# Patient Record
Sex: MALE | Race: WHITE
[De-identification: names, ages, dates, MRNs, and addresses within clinical notes are randomized per-mention and may not be internally consistent; named-entity substitution may affect disease eponyms.]

---

## 2020-01-21 ENCOUNTER — HOSPITAL ENCOUNTER (EMERGENCY)
Dept: HOSPITAL 33 - ED | Age: 25
Discharge: HOME | End: 2020-01-21
Payer: COMMERCIAL

## 2020-01-21 VITALS — HEART RATE: 97 BPM | DIASTOLIC BLOOD PRESSURE: 94 MMHG | SYSTOLIC BLOOD PRESSURE: 131 MMHG

## 2020-01-21 VITALS — OXYGEN SATURATION: 98 %

## 2020-01-21 DIAGNOSIS — R56.9: Primary | ICD-10-CM

## 2020-01-21 LAB
ALBUMIN SERPL-MCNC: 4.7 G/DL (ref 3.5–5)
ALP SERPL-CCNC: 49 U/L (ref 38–126)
ALT SERPL-CCNC: 22 U/L (ref 0–50)
AMPHETAMINES UR QL: NEGATIVE
ANION GAP SERPL CALC-SCNC: 16.6 MEQ/L (ref 5–15)
AST SERPL QL: 26 U/L (ref 17–59)
BARBITURATES UR QL: NEGATIVE
BASOPHILS # BLD AUTO: 0.03 10*3/UL (ref 0–0.4)
BASOPHILS NFR BLD AUTO: 0.4 % (ref 0–0.4)
BENZODIAZ UR QL SCN: NEGATIVE
BILIRUB BLD-MCNC: 0.7 MG/DL (ref 0.2–1.3)
BUN SERPL-MCNC: 12 MG/DL (ref 9–20)
CALCIUM SPEC-MCNC: 9.4 MG/DL (ref 8.4–10.2)
CHLORIDE SERPL-SCNC: 103 MMOL/L (ref 98–107)
CO2 SERPL-SCNC: 26 MMOL/L (ref 22–30)
COCAINE UR QL SCN: NEGATIVE
CREAT SERPL-MCNC: 0.96 MG/DL (ref 0.66–1.25)
EOSINOPHIL # BLD AUTO: 0.04 10*3/UL (ref 0–0.5)
GLUCOSE SERPL-MCNC: 98 MG/DL (ref 74–106)
GLUCOSE UR-MCNC: NEGATIVE MG/DL
HCT VFR BLD AUTO: 43.8 % (ref 42–50)
HGB BLD-MCNC: 15.4 GM/DL (ref 12.5–18)
LYMPHOCYTES # SPEC AUTO: 1.88 10*3/UL (ref 1–4.6)
MCH RBC QN AUTO: 31.8 PG (ref 26–32)
MCHC RBC AUTO-ENTMCNC: 35.2 G/DL (ref 32–36)
METHADONE UR QL: NEGATIVE
MONOCYTES # BLD AUTO: 0.54 10*3/UL (ref 0–1.3)
OPIATES UR QL: NEGATIVE
PCP UR QL CFM>20 NG/ML: NEGATIVE
PLATELET # BLD AUTO: 225 K/MM3 (ref 150–450)
POTASSIUM SERPLBLD-SCNC: 4.4 MMOL/L (ref 3.5–5.1)
PROT SERPL-MCNC: 8.2 G/DL (ref 6.3–8.2)
PROT UR STRIP-MCNC: NEGATIVE MG/DL
RBC # BLD AUTO: 4.84 M/MM3 (ref 4.1–5.6)
RBC #/AREA URNS HPF: (no result) /HPF (ref 0–2)
SODIUM SERPL-SCNC: 141 MMOL/L (ref 137–145)
THC UR QL SCN: NEGATIVE
WBC # BLD AUTO: 8.5 K/MM3 (ref 4–10.5)
WBC #/AREA URNS HPF: (no result) /HPF (ref 0–5)

## 2020-01-21 PROCEDURE — 80053 COMPREHEN METABOLIC PANEL: CPT

## 2020-01-21 PROCEDURE — 80307 DRUG TEST PRSMV CHEM ANLYZR: CPT

## 2020-01-21 PROCEDURE — 81001 URINALYSIS AUTO W/SCOPE: CPT

## 2020-01-21 PROCEDURE — 94760 N-INVAS EAR/PLS OXIMETRY 1: CPT

## 2020-01-21 PROCEDURE — 96365 THER/PROPH/DIAG IV INF INIT: CPT

## 2020-01-21 PROCEDURE — 36415 COLL VENOUS BLD VENIPUNCTURE: CPT

## 2020-01-21 PROCEDURE — 93041 RHYTHM ECG TRACING: CPT

## 2020-01-21 PROCEDURE — 85025 COMPLETE CBC W/AUTO DIFF WBC: CPT

## 2020-01-21 PROCEDURE — 70450 CT HEAD/BRAIN W/O DYE: CPT

## 2020-01-21 PROCEDURE — 99284 EMERGENCY DEPT VISIT MOD MDM: CPT

## 2020-01-21 NOTE — XRAY
Indication: Headache.  Seizures.



Multiple contiguous axial images obtained through the head without contrast.



Comparison: None



Normal appearing brain parenchyma, ventricles, and bony calvarium.  Visualized

paranasal sinuses and mastoid air cells are clear.



Impression: Normal CT head without contrast exam.

## 2020-01-21 NOTE — ERPHSYRPT
- History of Present Illness


Time Seen by Provider: 01/21/20 11:20


Source: patient, police


Exam Limitations: no limitations


Patient Subjective Stated Complaint: pt here for a seizure at the residential, he 

states last seizure was a month ago and he has never seen a doc for hes seizures

, pt co pain to head, which he stated happend before the seizure, he gets 

fregquent headaches,


Triage Nursing Assessment: pt alert and oreinted , resp easy, skin w/d/p.moves 

all ext well, no edema, he states he is fine and wants to go back to residential


Physician History: 





25 y/o white male resident in local residential with h/o untreated seizures, presents 

with second seizure in a month. pt denies illicit drug intake, denies acute 

head trauma. pt denies loss of bowel or bladder control


Timing/Duration: today


Severity: mild


Character of Deficits: none


Deficits: no difficulties


Baseline/Normal Cognition: alert oriented x 3


Current Cognition: alert oriented x 3


Baseline Gait: walks w/o assistance


Associated Symptoms: seizures


Allergies/Adverse Reactions: 








No Known Drug Allergies Allergy (Unverified 01/21/20 11:11)


 





Hx Influenza Vaccination/Date Given: No


Hx Pneumococcal Vaccination/Date Given: No


Immunizations Up to Date: Yes





- Review of Systems


Constitutional: No Symptoms


Eyes: No Symptoms


Ears, Nose, & Throat: No Symptoms


Respiratory: No Symptoms


Cardiac: No Symptoms


Abdominal/Gastrointestinal: No Symptoms


Genitourinary Symptoms: No Symptoms


Musculoskeletal: No Symptoms


Skin: No Symptoms


Neurological: Headache, Seizure


Psychological: No Symptoms


Endocrine: No Symptoms


Hematologic/Lymphatic: No Symptoms


Immunological/Allergic: No Symptoms


All Other Systems: Reviewed and Negative





- Past Medical History


Pertinent Past Medical History: Yes


Neurological History: Seizures


ENT History: No Pertinent History


Cardiac History: No Pertinent History


Respiratory History: No Pertinent History


Endocrine Medical History: No Pertinent History


Musculoskeletal History: No Pertinent History


GI Medical History: No Pertinent History


 History: No Pertinent History


Psycho-Social History: No Pertinent History


Male Reproductive Disorders: No Pertinent History





- Past Surgical History


Past Surgical History: No


Neuro Surgical History: No Pertinent History


Cardiac: No Pertinent History


Respiratory: No Pertinent History


Gastrointestinal: No Pertinent History


Genitourinary: No Pertinent History


Musculoskeletal: No Pertinent History


Male Surgical History: No Pertinent History





- Social History


Smoking Status: Current every day smoker


Exposure to second hand smoke: Yes


Drug Use: none


Patient Lives Alone: No





- Nursing Vital Signs


Nursing Vital Signs: 


 Initial Vital Signs











Temperature  98.2 F   01/21/20 11:04


 


Pulse Rate  101 H  01/21/20 11:04


 


Respiratory Rate  16   01/21/20 11:04


 


Blood Pressure  130/94   01/21/20 11:04


 


O2 Sat by Pulse Oximetry  98   01/21/20 11:04








 Pain Scale











Pain Intensity                 7

















- [place] Coma Scale


Best Eye Response ([place]): (4) open spontaneously


Best Verbal Response ([place]): (5) oriented


Best Motor Response (Michel): (6) obeys commands


[place] Total: 15





- Physical Exam


General Appearance: no apparent distress


Eye Exam: bilateral eye: normal inspection, PERRL, EOMI


Ears, Nose, Throat Exam: normal ENT inspection, TMs normal, moist mucous 

membranes


Neck Exam: normal inspection, non-tender, supple, full range of motion


Respiratory: normal breath sounds, lungs clear, airway intact, No chest 

tenderness, No respiratory distress


Cardiovascular: regular rate/rhythm, normal heart sounds, normal peripheral 

pulses


Gastrointestinal: soft, normal bowel sounds, No tenderness


Rectal Exam: not done


Back Exam: normal inspection, normal range of motion, No CVA tenderness, No 

vertebral tenderness


Extremity Exam: normal inspection, normal range of motion, pelvis stable


Mental Status: alert, oriented x 3, cooperative


CNs Exam: normal hearing, normal speech, PERRL


Coordination/Gait: normal finger to nose, normal gait, normal cerebellar 

function


Motor/Sensory: no motor deficit, no sensory deficit, no pronator drift


Skin Exam: normal color, warm, dry


**SpO2 Interpretation**: normal


SpO2: 98


O2 Delivery: Room Air





- Course


Nursing assessment & vital signs reviewed: Yes


EKG Interpreted by Me: RATE ([id number]), Sinus Rhythm, NORMAL AXIS, NORMAL INTERVALS, 

NORMAL QRS


Ordered Tests: 


 Active Orders 24 hr











 Category Date Time Status


 


 Cardiac Monitor STAT Care  01/21/20 11:30 Active


 


 Clean Catch Urine Specimen STAT Care  01/21/20 11:28 Active


 


 IV Insertion STAT Care  01/21/20 11:28 Active


 


 Pulse Oximetry (ED) STAT Care  01/21/20 11:28 Active


 


 HEAD WITHOUT CONTRAST [CT] Stat Exams  01/21/20 11:29 Completed


 


 CBC W DIFF Stat Lab  01/21/20 12:00 Completed


 


 CMP Stat Lab  01/21/20 12:00 Completed


 


 UA W/RFX UR CULTURE Stat Lab  01/21/20 11:59 Completed


 


 Urine Triage Profile Stat Lab  01/21/20 11:59 Completed








Medication Summary














Discontinued Medications














Generic Name Dose Route Start Last Admin





  Trade Name Yuly  PRN Reason Stop Dose Admin


 


Acetaminophen  650 mg  01/21/20 11:30  01/21/20 11:35





  Tylenol 325 Mg***  PO  01/21/20 11:31  650 mg





  STAT STA   Administration





     





     





     





     


 


Acetaminophen  Confirm  01/21/20 11:34  





  Tylenol 325 Mg***  Administered  01/21/20 11:35  





  Dose   





  650 mg   





  .ROUTE   





  .STK-MED ONE   





     





     





     





     











Lab/Rad Data: 


 Laboratory Result Diagrams





 01/21/20 12:00 





 01/21/20 12:00 





 Laboratory Results











  01/21/20 01/21/20 01/21/20 Range/Units





  12:00 12:00 11:59 


 


WBC   8.5   (4.0-10.5)  K/mm3


 


RBC   4.84   (4.1-5.6)  M/mm3


 


Hgb   15.4   (12.5-18.0)  gm/dl


 


Hct   43.8   (42-50)  %


 


MCV   90.5   ()  fl


 


MCH   31.8   (26-32)  pg


 


MCHC   35.2   (32-36)  g/dl


 


RDW   12.2   (11.5-14.0)  %


 


Plt Count   225   (150-450)  K/mm3


 


MPV   9.9   (7.5-11.0)  fl


 


Gran %   70.7 H   (36.0-66.0)  %


 


Eos # (Auto)   0.04   (0-0.5)  


 


Absolute Lymphs (auto)   1.88   (1.0-4.6)  


 


Absolute Monos (auto)   0.54   (0.0-1.3)  


 


Lymphocytes %   22.1 L   (24.0-44.0)  %


 


Monocytes %   6.3   (0.0-12.0)  %


 


Eosinophils %   0.5   (0.00-5.0)  %


 


Basophils %   0.4   (0.0-0.4)  %


 


Absolute Granulocytes   6.02   (1.4-6.9)  


 


Basophils #   0.03   (0-0.4)  


 


Sodium  141    (137-145)  mmol/L


 


Potassium  4.4    (3.5-5.1)  mmol/L


 


Chloride  103    ()  mmol/L


 


Carbon Dioxide  26    (22-30)  mmol/L


 


Anion Gap  16.6 H    (5-15)  MEQ/L


 


BUN  12    (9-20)  mg/dL


 


Creatinine  0.96    (0.66-1.25)  mg/dL


 


Estimated GFR  > 60.0    ML/MIN


 


Glucose  98    ()  mg/dL


 


Calcium  9.4    (8.4-10.2)  mg/dL


 


Total Bilirubin  0.70    (0.2-1.3)  mg/dL


 


AST  26    (17-59)  U/L


 


ALT  22    (0-50)  U/L


 


Alkaline Phosphatase  49    ()  U/L


 


Serum Total Protein  8.2    (6.3-8.2)  g/dL


 


Albumin  4.7    (3.5-5.0)  g/dL


 


Urine Color     (YELLOW)  


 


Urine Appearance     (CLEAR)  


 


Urine pH     (5-6)  


 


Ur Specific Gravity     (1.005-1.025)  


 


Urine Protein     (Negative)  


 


Urine Ketones     (NEGATIVE)  


 


Urine Blood     (0-5)  Chris/ul


 


Urine Nitrite     (NEGATIVE)  


 


Urine Bilirubin     (NEGATIVE)  


 


Urine Urobilinogen     (0-1)  mg/dL


 


Ur Leukocyte Esterase     (NEGATIVE)  


 


Urine WBC (Auto)     (0-5)  /HPF


 


Urine RBC (Auto)     (0-2)  /HPF


 


U Epithel Cells (Auto)     (FEW)  /HPF


 


Urine Bacteria (Auto)     (NEGATIVE)  /HPF


 


Urine Mucus (Auto)     (NEGATIVE)  /HPF


 


Urine Culture Reflexed     (NO)  


 


Urine Glucose     (NEGATIVE)  mg/dL


 


Urine Opiates Level    NEGATIVE  (NEGATIVE)  


 


Ur Methadone    NEGATIVE  (NEGATIVE)  


 


Urine Barbiturates    NEGATIVE  (NEGATIVE)  


 


Ur Phencyclidine (PCP)    NEGATIVE  (NEGATIVE)  


 


Urine Amphetamine    NEGATIVE  (NEGATIVE)  


 


U Benzodiazepine Level    NEGATIVE  (NEGATIVE)  


 


Urine Cocaine    NEGATIVE  (NEGATIVE)  


 


Urine Marijuana (THC)    NEGATIVE  (NEGATIVE)  














  01/21/20 Range/Units





  11:59 


 


WBC   (4.0-10.5)  K/mm3


 


RBC   (4.1-5.6)  M/mm3


 


Hgb   (12.5-18.0)  gm/dl


 


Hct   (42-50)  %


 


MCV   ()  fl


 


MCH   (26-32)  pg


 


MCHC   (32-36)  g/dl


 


RDW   (11.5-14.0)  %


 


Plt Count   (150-450)  K/mm3


 


MPV   (7.5-11.0)  fl


 


Gran %   (36.0-66.0)  %


 


Eos # (Auto)   (0-0.5)  


 


Absolute Lymphs (auto)   (1.0-4.6)  


 


Absolute Monos (auto)   (0.0-1.3)  


 


Lymphocytes %   (24.0-44.0)  %


 


Monocytes %   (0.0-12.0)  %


 


Eosinophils %   (0.00-5.0)  %


 


Basophils %   (0.0-0.4)  %


 


Absolute Granulocytes   (1.4-6.9)  


 


Basophils #   (0-0.4)  


 


Sodium   (137-145)  mmol/L


 


Potassium   (3.5-5.1)  mmol/L


 


Chloride   ()  mmol/L


 


Carbon Dioxide   (22-30)  mmol/L


 


Anion Gap   (5-15)  MEQ/L


 


BUN   (9-20)  mg/dL


 


Creatinine   (0.66-1.25)  mg/dL


 


Estimated GFR   ML/MIN


 


Glucose   ()  mg/dL


 


Calcium   (8.4-10.2)  mg/dL


 


Total Bilirubin   (0.2-1.3)  mg/dL


 


AST   (17-59)  U/L


 


ALT   (0-50)  U/L


 


Alkaline Phosphatase   ()  U/L


 


Serum Total Protein   (6.3-8.2)  g/dL


 


Albumin   (3.5-5.0)  g/dL


 


Urine Color  YELLOW  (YELLOW)  


 


Urine Appearance  CLEAR  (CLEAR)  


 


Urine pH  7.0  (5-6)  


 


Ur Specific Gravity  1.016  (1.005-1.025)  


 


Urine Protein  NEGATIVE  (Negative)  


 


Urine Ketones  NEGATIVE  (NEGATIVE)  


 


Urine Blood  NEGATIVE  (0-5)  Chris/ul


 


Urine Nitrite  NEGATIVE  (NEGATIVE)  


 


Urine Bilirubin  NEGATIVE  (NEGATIVE)  


 


Urine Urobilinogen  NEGATIVE  (0-1)  mg/dL


 


Ur Leukocyte Esterase  NEGATIVE  (NEGATIVE)  


 


Urine WBC (Auto)  0-2  (0-5)  /HPF


 


Urine RBC (Auto)  NONE  (0-2)  /HPF


 


U Epithel Cells (Auto)  NONE  (FEW)  /HPF


 


Urine Bacteria (Auto)  NONE  (NEGATIVE)  /HPF


 


Urine Mucus (Auto)  SLIGHT  (NEGATIVE)  /HPF


 


Urine Culture Reflexed  NO  (NO)  


 


Urine Glucose  NEGATIVE  (NEGATIVE)  mg/dL


 


Urine Opiates Level   (NEGATIVE)  


 


Ur Methadone   (NEGATIVE)  


 


Urine Barbiturates   (NEGATIVE)  


 


Ur Phencyclidine (PCP)   (NEGATIVE)  


 


Urine Amphetamine   (NEGATIVE)  


 


U Benzodiazepine Level   (NEGATIVE)  


 


Urine Cocaine   (NEGATIVE)  


 


Urine Marijuana (THC)   (NEGATIVE)  














- Progress


Progress: improved


Progress Note: 





01/21/20 13:12


ct head-wnl no acute intracranial process


Counseled pt/family regarding: lab results, diagnosis, need for follow-up, rad 

results





- Departure


Departure Disposition: alf/jail


Clinical Impression: 


 Seizure





Condition: Stable


Critical Care Time: No


Additional Instructions: 


follow up with primary doctor and neurologist for further management


Prescriptions: 


Phenytoin Sod Extended 100 mg* [Dilantin 100 MG***] 100 mg PO TID #30 capsule

## 2021-09-21 ENCOUNTER — HOSPITAL ENCOUNTER (EMERGENCY)
Dept: HOSPITAL 33 - ED | Age: 26
Discharge: HOME | End: 2021-09-21
Payer: COMMERCIAL

## 2021-09-21 VITALS — SYSTOLIC BLOOD PRESSURE: 160 MMHG | HEART RATE: 60 BPM | OXYGEN SATURATION: 99 % | DIASTOLIC BLOOD PRESSURE: 86 MMHG

## 2021-09-21 DIAGNOSIS — Z79.899: ICD-10-CM

## 2021-09-21 DIAGNOSIS — G40.909: Primary | ICD-10-CM

## 2021-09-21 LAB
ALBUMIN SERPL-MCNC: 4 G/DL (ref 3.5–5)
ALP SERPL-CCNC: 43 U/L (ref 38–126)
ALT SERPL-CCNC: 30 U/L (ref 0–50)
AMPHETAMINES UR QL: POSITIVE
ANION GAP SERPL CALC-SCNC: 13.3 MEQ/L (ref 5–15)
APAP SPEC-MCNC: < 10 UG/ML (ref 10–30)
APTT PPP: 34.2 SECONDS (ref 25.1–36.5)
AST SERPL QL: 37 U/L (ref 17–59)
BARBITURATES UR QL: NEGATIVE
BASOPHILS # BLD AUTO: 0.01 10*3/UL (ref 0–0.4)
BASOPHILS NFR BLD AUTO: 0.1 % (ref 0–0.4)
BENZODIAZ UR QL SCN: NEGATIVE
BILIRUB BLD-MCNC: 0.4 MG/DL (ref 0.2–1.3)
BUN SERPL-MCNC: 13 MG/DL (ref 9–20)
CALCIUM SPEC-MCNC: 8.9 MG/DL (ref 8.4–10.2)
CHLORIDE SERPL-SCNC: 104 MMOL/L (ref 98–107)
CO2 SERPL-SCNC: 26 MMOL/L (ref 22–30)
COCAINE UR QL SCN: NEGATIVE
CREAT SERPL-MCNC: 0.9 MG/DL (ref 0.66–1.25)
EOSINOPHIL # BLD AUTO: 0.05 10*3/UL (ref 0–0.5)
ETHANOL SERPL-MCNC: < 10 MG/DL (ref 0–10)
GFR SERPLBLD BASED ON 1.73 SQ M-ARVRAT: > 60 ML/MIN
GLUCOSE SERPL-MCNC: 100 MG/DL (ref 74–106)
GLUCOSE UR-MCNC: NEGATIVE MG/DL
HCT VFR BLD AUTO: 41.8 % (ref 42–50)
HGB BLD-MCNC: 13.6 GM/DL (ref 12.5–18)
INR PPP: 0.96 (ref 0.8–3)
LYMPHOCYTES # SPEC AUTO: 2 10*3/UL (ref 1–4.6)
MCH RBC QN AUTO: 31.4 PG (ref 26–32)
MCHC RBC AUTO-ENTMCNC: 32.5 G/DL (ref 32–36)
METHADONE UR QL: NEGATIVE
MONOCYTES # BLD AUTO: 0.73 10*3/UL (ref 0–1.3)
OPIATES UR QL: NEGATIVE
PCP UR QL CFM>20 NG/ML: NEGATIVE
PLATELET # BLD AUTO: 250 K/MM3 (ref 150–450)
POTASSIUM SERPLBLD-SCNC: 3.7 MMOL/L (ref 3.5–5.1)
PROT SERPL-MCNC: 6.7 G/DL (ref 6.3–8.2)
PROT UR STRIP-MCNC: NEGATIVE MG/DL
PROTHROMBIN TIME: 11.3 SECONDS (ref 9.4–12.5)
RBC # BLD AUTO: 4.33 M/MM3 (ref 4.1–5.6)
RBC #/AREA URNS HPF: (no result) /HPF (ref 0–2)
SALICYLATES SERPL-MCNC: < 1 MG/DL (ref 2–20)
SODIUM SERPL-SCNC: 140 MMOL/L (ref 137–145)
THC UR QL SCN: NEGATIVE
WBC # BLD AUTO: 7.3 K/MM3 (ref 4–10.5)
WBC #/AREA URNS HPF: (no result) /HPF (ref 0–5)

## 2021-09-21 PROCEDURE — 84484 ASSAY OF TROPONIN QUANT: CPT

## 2021-09-21 PROCEDURE — 85730 THROMBOPLASTIN TIME PARTIAL: CPT

## 2021-09-21 PROCEDURE — G0480 DRUG TEST DEF 1-7 CLASSES: HCPCS

## 2021-09-21 PROCEDURE — 80185 ASSAY OF PHENYTOIN TOTAL: CPT

## 2021-09-21 PROCEDURE — 81001 URINALYSIS AUTO W/SCOPE: CPT

## 2021-09-21 PROCEDURE — 36415 COLL VENOUS BLD VENIPUNCTURE: CPT

## 2021-09-21 PROCEDURE — 83605 ASSAY OF LACTIC ACID: CPT

## 2021-09-21 PROCEDURE — 70450 CT HEAD/BRAIN W/O DYE: CPT

## 2021-09-21 PROCEDURE — 85025 COMPLETE CBC W/AUTO DIFF WBC: CPT

## 2021-09-21 PROCEDURE — 85610 PROTHROMBIN TIME: CPT

## 2021-09-21 PROCEDURE — 99284 EMERGENCY DEPT VISIT MOD MDM: CPT

## 2021-09-21 PROCEDURE — 80053 COMPREHEN METABOLIC PANEL: CPT

## 2021-09-21 PROCEDURE — 71045 X-RAY EXAM CHEST 1 VIEW: CPT

## 2021-09-21 PROCEDURE — 36000 PLACE NEEDLE IN VEIN: CPT

## 2021-09-21 PROCEDURE — 80307 DRUG TEST PRSMV CHEM ANLYZR: CPT
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
## 2023-03-08 ENCOUNTER — HOSPITAL ENCOUNTER (EMERGENCY)
Dept: HOSPITAL 33 - ED | Age: 28
Discharge: HOME | End: 2023-03-08
Payer: COMMERCIAL

## 2023-03-08 VITALS — HEART RATE: 70 BPM

## 2023-03-08 VITALS — DIASTOLIC BLOOD PRESSURE: 81 MMHG | SYSTOLIC BLOOD PRESSURE: 131 MMHG | OXYGEN SATURATION: 100 %

## 2023-03-08 DIAGNOSIS — Z28.310: ICD-10-CM

## 2023-03-08 DIAGNOSIS — Z72.0: ICD-10-CM

## 2023-03-08 DIAGNOSIS — T81.72XA: Primary | ICD-10-CM

## 2023-03-08 DIAGNOSIS — I80.8: ICD-10-CM

## 2023-03-08 PROCEDURE — 99281 EMR DPT VST MAYX REQ PHY/QHP: CPT

## 2023-03-08 NOTE — ERPHSYRPT
- History of Present Illness


Time Seen by Provider: 03/08/23 03:57


Source: patient


Exam Limitations: no limitations


Patient Subjective Stated Complaint: Pt reports "I donated plasma on Thursday 

and while doing so the machine slowed down, started alarming and my vein started

swelling up. I noticed this morning about 0200 my arm is discolored, I drew a 

line around the discoloration and it started going outside the line and my 

fingertips are tingling."


Triage Nursing Assessment: Pt alert and oriented x3. No apparent respiratory 

distress. Ambulated to ED cot without difficulty. Skin w/p/d. Skin around left 

antecubital extending into upper and lower arm has tenderness to touch/erythema.

Radial pulse regular, strong, +3.


Physician History: 


Patient is a 28-year-old male presents to our ED for evaluation of of discolor

ation to the antecubital fossa and tenderness to the antecubital vein.  Patient 

states he went to donate plasma on Thursday 5 days ago.  Patient states the vein

swelled up and the donation was discontinued.  Patient was sent home.  At 

approximately 2:00 this morning patient observed the area to be discolored.  

Patient became concerned and decided come to our ED for evaluation.  No pain at 

rest.  No fever.  No chest pain or shortness of breath.  No nausea vomiting or 

diaphoresis.  Patient states he is healthy.  Significant other at bedside.  They

voiced no other complaints or concerns at this time.








Portions of this note were created with voice recognition technology.  There may

be grammatical, spelling, punctuation or sound alike errors





Timing/Duration: today


Severity: moderate


Modifying Factors: Improves With: nothing


Associated Symptoms: denies symptoms


Allergies/Adverse Reactions: 








No Known Drug Allergies Allergy (Verified 03/08/23 02:30)


   





Home Medications: 








No Reportable Medications [No Reported Medications]  03/08/23 [History]





Hx Tetanus, Diphtheria Vaccination/Date Given: No


Hx Influenza Vaccination/Date Given: No


Hx Pneumococcal Vaccination/Date Given: No





Travel Risk





- International Travel


Have you traveled outside of the country in past 3 weeks: No





- Coronavirus Screening


Are you exhibiting any of the following symptoms?: No


Close contact with a COVID-19 positive Pt in past 14-21 Days: No





- Vaccine Status


Have you recieved a Covid-19 vaccination: No





- Review of Systems


Constitutional: No Symptoms, No Fever, No Chills


Eyes: No Symptoms


Ears, Nose, & Throat: No Symptoms


Respiratory: No Symptoms, No Cough, No Dyspnea


Cardiac: No Symptoms, No Chest Pain, No Edema, No Syncope


Abdominal/Gastrointestinal: No Symptoms, No Abdominal Pain, No Nausea, No 

Vomiting, No Diarrhea


Genitourinary Symptoms: No Symptoms, No Dysuria


Musculoskeletal: No Symptoms, No Back Pain, No Neck Pain


Skin: No Symptoms, No Rash


Neurological: No Symptoms, No Dizziness, No Focal Weakness, No Sensory Changes


Psychological: No Symptoms


Endocrine: No Symptoms


Hematologic/Lymphatic: No Symptoms


Immunological/Allergic: No Symptoms


All Other Systems: Reviewed and Negative





- Past Medical History


Pertinent Past Medical History: Yes


Neurological History: Seizures


ENT History: No Pertinent History


Cardiac History: No Pertinent History


Respiratory History: No Pertinent History


Endocrine Medical History: No Pertinent History


Musculoskeletal History: No Pertinent History


GI Medical History: No Pertinent History


 History: No Pertinent History


Psycho-Social History: No Pertinent History


Male Reproductive Disorders: No Pertinent History





- Past Surgical History


Past Surgical History: Yes


Neuro Surgical History: No Pertinent History


Cardiac: No Pertinent History


Respiratory: No Pertinent History


Gastrointestinal: No Pertinent History


Genitourinary: No Pertinent History


Musculoskeletal: No Pertinent History


Male Surgical History: No Pertinent History


Other Surgical History: foreign body removed from esophagus





- Social History


Smoking Status: Current every day smoker


How long have you smoked: years


Exposure to second hand smoke: Yes


Drug Use: none


Patient Lives Alone: No





- Nursing Vital Signs


Nursing Vital Signs: 


                               Initial Vital Signs











Temperature  97.6 F   03/08/23 02:30


 


Pulse Rate  90   03/08/23 02:30


 


Respiratory Rate  15   03/08/23 02:30


 


Blood Pressure  134/94   03/08/23 02:30


 


O2 Sat by Pulse Oximetry  99   03/08/23 02:30








                                   Pain Scale











Pain Intensity                 2

















- Physical Exam


General Appearance: no apparent distress, alert


Eye Exam: PERRL/EOMI, eyes nml inspection


Ears, Nose, Throat Exam: normal ENT inspection, TMs normal, pharynx normal, 

moist mucous membranes


Neck Exam: normal inspection, non-tender, supple, full range of motion


Respiratory Exam: normal breath sounds, lungs clear, airway intact, No 

respiratory distress


Cardiovascular Exam: regular rate/rhythm, normal heart sounds, normal peripheral

pulses


Gastrointestinal/Abdomen Exam: soft, normal bowel sounds, No tenderness, No mass


Back Exam: normal inspection, normal range of motion, No CVA tenderness, No 

vertebral tenderness


Extremity Exam: normal inspection, normal range of motion, pelvis stable, other 

(Resolving bruise at the venous site.  Antecubital vein slightly tender.  No 

swelling of the extremity.  No signs of infection.  No heat generation.  No 

swelling.  No lymphangitis.  No lymphadenopathy.  Temperature between both arms 

is the same.  Compartments are soft.  Cap refill less than 2 seconds), No burns,

No contusions (Resolving bruising at the end decubital fossa.   No axillary 

lymphadenopathy), No tenderness


Neurologic Exam: alert, oriented x 3, cooperative, normal mood/affect, nml 

cerebellar function, nml station & gait, sensation nml, No motor deficits


Skin Exam: normal color, warm, dry, No rash


Lymphatic Exam: No adenopathy


**SpO2 Interpretation**: normal


SpO2: 100


O2 Delivery: Room Air





- Course


Nursing assessment & vital signs reviewed: Yes





- Progress


Progress: improved


Progress Note: 


28-year-old male presents to our ED for evaluation of discoloration to the left 

antecubital fossa.  Patient also has tenderness at the antecubital vein 

specifically the vein that was accessed for plasma donation.  Physical exam 

reveals a resolving hematoma.  Extremities neurovascular intact distally.  There

are no signs of infection.  Patient appears to be experiencing a phlebitis.  No 

indication for further work-up.  No signs of a DVT.  No upper extremity 

swelling.  Patient advised to apply a warm pack to the area at least twice daily

for approximately 20 minutes and a daily aspirin for the following week.  

Patient agrees to follow-up with his primary care doctor within 48 hours for 

reevaluation.  Patient has no active pain at this time.  No indication for pain 

medication.  Patient understands that if symptoms worsen or if arm swells he is 

to return to our ED for reevaluation.





Patient served as an independent historian.








Portions of this note were created with voice recognition technology.  There may

be grammatical, spelling, punctuation or sound alike errors





Complexity of problems addressed is low.  Acute uncomplicated.  Complexity of 

data reviewed and analyzed is none.  No specialized testing ordered.  Diagnosis 

was made based on history and physical exam.








Risk of complication and/or morbidity/mortality of patient management is 

minimal.  Patient discharged home.  He agrees to follow-up with his primary care

doctor within 48 hours for reevaluation.  Patient states he does not have a 

primary care at the moment so patient was assigned to our service Dr. Hector Mclaughlin.

 Significant other at bedside.  They voiced no other complaints or concerns at 

this time.











03/08/23 04:04











Counseled pt/family regarding: diagnosis, need for follow-up, rad results





- Departure


Departure Disposition: Home


Clinical Impression: 


 Phlebitis





Condition: Stable


Critical Care Time: No


Referrals: 


DOCTOR,NO FAMILY [Primary Care Provider] - Follow up/PCP as directed


ROSARIO METZGER [ACTIVE STAFF] - Follow up/PCP as directed


Additional Instructions: 


Discharge/Care Plan





COOKSEY,JARED A was seen on 03/08/23 in the Emergency Room. The patient was 

counseled regarding Diagnosis,Lab results, Imaging studies, need for follow up 

and when to return to the Emergency Room.





Prescriptions given:





Discharge Note





I have spoken with the patient and/or caregivers. I have explained the patient's

condition, diagnosis and treatment plan based on the information available to me

at this time. I have answered the patient's and/or caregiver's questions and 

addressed any concerns. The patient and/or caregivers have as good understanding

of the patient's diagnosis, condition and treatment plan as can be expected at 

this point. The vital signs have been stable. The patient's condition is stable 

and appropriate for discharge from the emergency department.





The patient will pursue further outpatient evaluation with the primary care 

physician or other designated or consulting physician as outlined in the 

discharge instructions. The patient and/or caregivers are agreeable to this plan

of care and follow-up instructions have been explained in detail. The patient 

and/or caregivers have received these instruction. The patient/and or caregivers

are aware that any significant change in condition or worsening of symptoms 

should prompt an immediate return to this or the closest emergency department or

call 911.

## 2024-08-03 ENCOUNTER — HOSPITAL ENCOUNTER (EMERGENCY)
Dept: HOSPITAL 33 - ED | Age: 29
Discharge: LEFT BEFORE DISCHARGE | End: 2024-08-03
Payer: COMMERCIAL

## 2024-08-03 VITALS — SYSTOLIC BLOOD PRESSURE: 155 MMHG | DIASTOLIC BLOOD PRESSURE: 98 MMHG

## 2024-08-03 VITALS — TEMPERATURE: 97.6 F | RESPIRATION RATE: 16 BRPM | HEART RATE: 111 BPM

## 2024-08-03 VITALS — OXYGEN SATURATION: 99 %

## 2024-08-03 DIAGNOSIS — S09.90XA: Primary | ICD-10-CM

## 2024-08-03 DIAGNOSIS — Z72.0: ICD-10-CM

## 2024-08-03 DIAGNOSIS — R51.9: ICD-10-CM
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
## 2024-08-03 PROCEDURE — 99281 EMR DPT VST MAYX REQ PHY/QHP: CPT

## 2024-08-03 NOTE — ERPHSYRPT
- History of Present Illness


Time Seen by Provider: 08/03/24 21:15


Source: patient, family, EMS


Exam Limitations: no limitations


Patient Subjective Stated Complaint: pt states he had a bulb fall on his head 

from approx 20 feet, denies loss of consciousness.


Triage Nursing Assessment: pt alert and oriented, answers questions approp. pt 

ambualtes into room with steady gait noted. respirations nonlabored. skin warm 

and dry. abrasions noted to top and rt side of head with minimal active bleeding

noted. pupils equal and reactive. pt moves extremities withoutdiff


Physician History: 





Pt came in to ER and has had earlier scan at another ER for head injury today. 


No LOC reported. Negative CT rad reading there and  Cpsine read without Fx.  and

Tet reported UTD. 


He has headache  increased so came in for recheck.


Fundi benign PERRLA, visual fields intact. No neuro deficits and normal gait and

coord. 


some debris may be in wounds and I advised the pt that we will try to get these 

out and treat headache , but he  did not wish to stay and left during this 

treatment, although we expressed concern that there could be additional 

progressing pathology for us to work up which could result in complications he 

and friend/family  have the capacity to make this choice. there was normal 

mental status. 











Occurred: this morning


Severity: moderate


Head Injury Location: occipital, parietal


Method of Injury: direct blow


Loss of Consciousness: no loss of consciousness


Associated Symptoms: headaches


Allergies/Adverse Reactions: 








No Known Drug Allergies Allergy (Verified 08/03/24 21:46)


   





Home Medications: 








No Reportable Medications [No Reported Medications]  03/08/23 [History]





Hx Tetanus, Diphtheria Vaccination/Date Given: Yes (2024)


Hx Influenza Vaccination/Date Given: No


Hx Pneumococcal Vaccination/Date Given: No


Immunizations Up to Date: Yes





Travel Risk





- International Travel


Have you traveled outside of the country in past 3 weeks: No





- Emerging Infectious Disease


Are you exhibiting symptoms associated with any current EIDs: No





- Review of Systems


Constitutional: No Fever, No Chills


Eyes: No Symptoms


Ears, Nose, & Throat: No Symptoms


Respiratory: No Cough, No Dyspnea


Cardiac: No Chest Pain, No Edema, No Syncope


Abdominal/Gastrointestinal: No Abdominal Pain, No Nausea, No Vomiting, No 

Diarrhea


Genitourinary Symptoms: No Dysuria


Musculoskeletal: No Back Pain, No Neck Pain


Skin: Other (lac without active bleeding), No Rash


Neurological: No Dizziness, No Focal Weakness, No Sensory Changes


Psychological: No Symptoms


Endocrine: No Symptoms


Hematologic/Lymphatic: No Symptoms


Immunological/Allergic: No Symptoms


All Other Systems: Reviewed and Negative





- Past Medical History


Pertinent Past Medical History: Yes


Neurological History: Seizures


ENT History: No Pertinent History


Cardiac History: No Pertinent History


Respiratory History: No Pertinent History


Endocrine Medical History: No Pertinent History


Musculoskeletal History: No Pertinent History


GI Medical History: No Pertinent History


 History: No Pertinent History


Psycho-Social History: No Pertinent History


Male Reproductive Disorders: No Pertinent History





- Past Surgical History


Past Surgical History: Yes


Neuro Surgical History: No Pertinent History


Cardiac: No Pertinent History


Respiratory: No Pertinent History


Gastrointestinal: No Pertinent History


Genitourinary: No Pertinent History


Musculoskeletal: No Pertinent History


Male Surgical History: No Pertinent History


Other Surgical History: foreign body removed from esophagus





- Social History


Smoking Status: Current every day smoker


How long have you smoked: 20yrs


Exposure to second hand smoke: Yes


Drug Use: none


Patient Lives Alone: No





- Social Determinants of Health


Will the patient participate in the screening: Declined to provide





- Nursing Vital Signs


Nursing Vital Signs: 


                               Initial Vital Signs











Temperature  97.6 F   08/03/24 21:04


 


Pulse Rate  111 H  08/03/24 21:04


 


Respiratory Rate  16   08/03/24 21:04


 


Blood Pressure  159/97   08/03/24 21:04


 


O2 Sat by Pulse Oximetry  99   08/03/24 21:04








                                   Pain Scale











Pain Intensity                 6

















- Michel Coma Score


Best Eye Response ([place]): (4) open spontaneously


Best Verbal Response (Michel): (5) oriented


Best Motor Response ([place]): (6) obeys commands


[place] Total: 15





- Physical Exam


General Appearance: no apparent distress, alert


Eye Exam: bilateral eye: PERRL, EOMI


ENT Exam: airway nml, No dental injury


Neck Exam: supple, trachea midline, full range of motion, normal alignment, 

normal inspection


Cardiovascular/Respiratory Exam: chest non-tender, normal breath sounds, regular

 rate/rhythm


Gastrointestinal/Abdominal Exam: soft, non tender, no distention


Back Exam: normal inspection, No vertebral tenderness


Extremity Exam: non-tender, normal range of motion, normal inspection


Mental Status Exam: alert, oriented x 3, cooperative


Motor/Sensory Exam: no motor deficit, no sensory deficit, CN II-XII intact


DTR Exam: bicep (R): 2+, bicep (L): 2+, tricep (R): 2+, tricep (L): 2+, knee 

(R): 2+, knee (L): 2+, ankle (R): 2+, ankle (L): 2+


Skin Exam: normal color, warm, dry, No rash


**SpO2 Interpretation**: normal


SpO2: 99


O2 Delivery: Room Air





- Course


Nursing assessment & vital signs reviewed: Yes


Ordered Tests: 


                               Active Orders 24 hr











 Category Date Time Status


 


 AMA [Release AMA] OM.NOW Care  08/03/24 22:33 Completed














- Progress


Progress: improved, re-examined


Counseled pt/family regarding: diagnosis, need for follow-up





Medical Desision Making





- Independent Historian


Additional History obtained from: Family





- Discussion of managment


Reviewed:: Test results, Need for additional workup


Agreed on:: Treatment plan, need for follow-up





- Diagnostic Testing


Diagnostic test were ordered, analyzed, and reviewed by me: No





- Risk of complications


The pt has a mod risk of morbidity or mortality based on: Need for prescription 

drug management





- Departure


Departure Disposition: AMA


Clinical Impression: 


 head injury with increased symptoms





Condition: Good


Critical Care Time: No


Referrals: 


DOCTOR,NO FAMILY [Primary Care Provider] - Follow up/PCP as directed


Instructions:  Concussion, Adult (DC), Head Injury in Adults (DC)


Additional Instructions: 


pt left prior to completion of treatment/evaluation - and expressed that he did 

not wish to stay for further eval or treatment at this time. 


He was told that we would like to try and help him and relieve his symptoms and 

could return anytime and to f/u BP with PMDs also.

## 2025-01-31 ENCOUNTER — HOSPITAL ENCOUNTER (EMERGENCY)
Dept: HOSPITAL 33 - ED | Age: 30
Discharge: LEFT BEFORE DISCHARGE | End: 2025-01-31
Payer: COMMERCIAL

## 2025-01-31 DIAGNOSIS — Z53.21: Primary | ICD-10-CM

## 2025-07-30 ENCOUNTER — HOSPITAL ENCOUNTER (EMERGENCY)
Dept: HOSPITAL 33 - ED | Age: 30
Discharge: HOME | End: 2025-07-30
Payer: COMMERCIAL

## 2025-07-30 VITALS — OXYGEN SATURATION: 99 % | RESPIRATION RATE: 16 BRPM

## 2025-07-30 VITALS — TEMPERATURE: 97.2 F | SYSTOLIC BLOOD PRESSURE: 128 MMHG | HEART RATE: 84 BPM | DIASTOLIC BLOOD PRESSURE: 76 MMHG

## 2025-07-30 DIAGNOSIS — K04.7: Primary | ICD-10-CM

## 2025-07-30 DIAGNOSIS — K08.89: ICD-10-CM

## 2025-07-30 DIAGNOSIS — Z72.0: ICD-10-CM

## 2025-07-30 DIAGNOSIS — Z79.899: ICD-10-CM

## 2025-07-30 RX ADMIN — LIDOCAINE HYDROCHLORIDE ONE ML: 20 SOLUTION ORAL; TOPICAL at 15:23

## 2025-07-30 RX ADMIN — ALUMINUM HYDROXIDE, MAGNESIUM HYDROXIDE, AND DIMETHICONE ONE ML: 400; 400; 40 SUSPENSION ORAL at 15:24

## 2025-07-30 RX ADMIN — BENZOCAINE, BUTAMBEN, AND TETRACAINE HYDROCHLORIDE ONE SPRAY: .028; .004; .004 AEROSOL, SPRAY TOPICAL at 15:25

## 2025-07-30 RX ADMIN — AMOXICILLIN AND CLAVULANATE POTASSIUM ONE MG: 875; 125 TABLET, FILM COATED ORAL at 15:25

## 2025-07-30 RX ADMIN — KETOROLAC TROMETHAMINE ONE MG: 30 INJECTION, SOLUTION INTRAMUSCULAR; INTRAVENOUS at 15:25
